# Patient Record
Sex: MALE | Employment: FULL TIME | URBAN - METROPOLITAN AREA
[De-identification: names, ages, dates, MRNs, and addresses within clinical notes are randomized per-mention and may not be internally consistent; named-entity substitution may affect disease eponyms.]

---

## 2023-03-02 ENCOUNTER — TELEPHONE (OUTPATIENT)
Dept: FAMILY MEDICINE | Facility: CLINIC | Age: 33
End: 2023-03-02

## 2023-03-02 NOTE — TELEPHONE ENCOUNTER
Left message to reschedule pt. Please help patient reschedule the canceled appt below:       - Provider:  Dr. Pang           - Canceled appt details         Date: 3/2/23         Time:  4:30pm         Type of visit:  Physical and Establish care      - Reason for change/cancellation: Provider call out       Notes to consider when rescheduling:  Can schedule in any same day or provider approval.        Please close encounter once the patient has been rescheduled    Harry Camarena MA on 3/2/2023 at 8:14 AM

## 2023-03-09 ENCOUNTER — OFFICE VISIT (OUTPATIENT)
Dept: FAMILY MEDICINE | Facility: CLINIC | Age: 33
End: 2023-03-09
Payer: COMMERCIAL

## 2023-03-09 VITALS
RESPIRATION RATE: 9 BRPM | HEIGHT: 69 IN | BODY MASS INDEX: 21.03 KG/M2 | OXYGEN SATURATION: 98 % | WEIGHT: 142 LBS | TEMPERATURE: 97.7 F | SYSTOLIC BLOOD PRESSURE: 123 MMHG | DIASTOLIC BLOOD PRESSURE: 69 MMHG | HEART RATE: 87 BPM

## 2023-03-09 DIAGNOSIS — Z13.220 LIPID SCREENING: ICD-10-CM

## 2023-03-09 DIAGNOSIS — Z28.21 COVID-19 VACCINATION DECLINED: ICD-10-CM

## 2023-03-09 DIAGNOSIS — Z28.21 TETANUS, DIPHTHERIA, AND ACELLULAR PERTUSSIS (TDAP) VACCINATION DECLINED: ICD-10-CM

## 2023-03-09 DIAGNOSIS — Z28.21 INFLUENZA VACCINATION DECLINED: ICD-10-CM

## 2023-03-09 DIAGNOSIS — Z00.00 ROUTINE GENERAL MEDICAL EXAMINATION AT A HEALTH CARE FACILITY: Primary | ICD-10-CM

## 2023-03-09 DIAGNOSIS — R10.13 ABDOMINAL PAIN, EPIGASTRIC: ICD-10-CM

## 2023-03-09 DIAGNOSIS — Z13.1 SCREENING FOR DIABETES MELLITUS: ICD-10-CM

## 2023-03-09 PROCEDURE — 99213 OFFICE O/P EST LOW 20 MIN: CPT | Mod: 25 | Performed by: FAMILY MEDICINE

## 2023-03-09 PROCEDURE — 80048 BASIC METABOLIC PNL TOTAL CA: CPT | Performed by: FAMILY MEDICINE

## 2023-03-09 PROCEDURE — 99385 PREV VISIT NEW AGE 18-39: CPT | Mod: 25 | Performed by: FAMILY MEDICINE

## 2023-03-09 PROCEDURE — 80061 LIPID PANEL: CPT | Performed by: FAMILY MEDICINE

## 2023-03-09 PROCEDURE — 36415 COLL VENOUS BLD VENIPUNCTURE: CPT | Performed by: FAMILY MEDICINE

## 2023-03-09 ASSESSMENT — ENCOUNTER SYMPTOMS
ABDOMINAL PAIN: 1
HEADACHES: 1

## 2023-03-09 ASSESSMENT — PAIN SCALES - GENERAL: PAINLEVEL: MILD PAIN (3)

## 2023-03-09 NOTE — H&P (VIEW-ONLY)
SUBJECTIVE:   CC: Jd is an 32 year old who presents for preventative health visit.     Patient has been advised of split billing requirements and indicates understanding: Yes  Healthy Habits:     Getting at least 3 servings of Calcium per day:  Yes    Bi-annual eye exam:  NO    Dental care twice a year:  Yes    Sleep apnea or symptoms of sleep apnea:  Daytime drowsiness    Diet:  Regular (no restrictions)    Frequency of exercise:  2-3 days/week    Duration of exercise:  Greater than 60 minutes    Taking medications regularly:  Yes    Medication side effects:  Not applicable    PHQ-2 Total Score: 0    Additional concerns today:  No    Has been dealing with Gastro problems for years, epigastric pain. Would like referral to Gastro.     Told it is anxiety. Feels the pain is causing anxiety.    Today's PHQ-2 Score:   PHQ-2 ( 1999 Pfizer) 3/9/2023   Q1: Little interest or pleasure in doing things 0   Q2: Feeling down, depressed or hopeless 0   PHQ-2 Score 0   Q1: Little interest or pleasure in doing things Not at all   Q2: Feeling down, depressed or hopeless Not at all   PHQ-2 Score 0     Have you ever done Advance Care Planning? (For example, a Health Directive, POLST, or a discussion with a medical provider or your loved ones about your wishes): No, advance care planning information given to patient to review.  Advanced care planning was discussed at today's visit.    Social History     Tobacco Use     Smoking status: Never     Smokeless tobacco: Current     Types: Chew   Substance Use Topics     Alcohol use: Yes     Alcohol/week: 1.0 standard drink     Types: 1 Standard drinks or equivalent per week         Alcohol Use 3/9/2023   Prescreen: >3 drinks/day or >7 drinks/week? No     Last PSA: No results found for: PSA    Reviewed orders with patient. Reviewed health maintenance and updated orders accordingly - Yes  Lab work is in process  BP Readings from Last 3 Encounters:   03/09/23 123/69    Wt Readings from Last 3  Encounters:   03/09/23 64.4 kg (142 lb)                  Patient Active Problem List   Diagnosis   (none) - all problems resolved or deleted     Past Surgical History:   Procedure Laterality Date     COLONOSCOPY       TONSILLECTOMY         Social History     Tobacco Use     Smoking status: Never     Smokeless tobacco: Current     Types: Chew   Substance Use Topics     Alcohol use: Yes     Alcohol/week: 1.0 standard drink     Types: 1 Standard drinks or equivalent per week     Family History   Problem Relation Age of Onset     Anxiety Disorder Mother      Diabetes Type 2  Father      GI problems Father      Esophageal Cancer Paternal Grandmother      GI problems Paternal Half-Sister      GI problems Maternal Half-Sister      Prostate Cancer Maternal Uncle      Colon Cancer No family hx of      Breast Cancer No family hx of          No current outpatient medications on file.     Allergies   Allergen Reactions     Amoxicillin Hives     Nystatin-Triamcinolone Hives     Bacitracin Rash       Reviewed and updated as needed this visit by clinical staff   Tobacco  Allergies  Meds  Problems  Med Hx  Surg Hx  Fam Hx          Reviewed and updated as needed this visit by Provider   Tobacco  Allergies  Meds  Problems  Med Hx  Surg Hx  Fam Hx       Social history reviewed.  Past Medical History:   Diagnosis Date     Anxiety       Past Surgical History:   Procedure Laterality Date     COLONOSCOPY       TONSILLECTOMY       Review of Systems   Gastrointestinal: Positive for abdominal pain.   Genitourinary: Negative for impotence and penile discharge.   Neurological: Positive for headaches.     CONSTITUTIONAL: NEGATIVE for fever, chills, change in weight  INTEGUMENTARY/SKIN: NEGATIVE for worrisome rashes, moles or lesions  EYES: NEGATIVE for vision changes or irritation  ENT: NEGATIVE for ear, mouth and throat problems  RESP: NEGATIVE for significant cough or SOB  CV: NEGATIVE for chest pain, palpitations or peripheral  "edema  MUSCULOSKELETAL: NEGATIVE for significant arthralgias or myalgia  PSYCHIATRIC: NEGATIVE for changes in mood or affect    OBJECTIVE:   /69 (BP Location: Left arm, Patient Position: Sitting, Cuff Size: Adult Regular)   Pulse 87   Temp 97.7  F (36.5  C) (Temporal)   Resp (!) 9   Ht 1.765 m (5' 9.49\")   Wt 64.4 kg (142 lb)   SpO2 98%   BMI 20.68 kg/m      Physical Exam  GENERAL: healthy, alert and no distress  EYES: Eyes grossly normal to inspection, PERRL and conjunctivae and sclerae normal  HENT: ear canals and TM's normal, nose and mouth without ulcers or lesions  NECK: no adenopathy, no asymmetry, masses, or scars and thyroid normal to palpation  RESP: lungs clear to auscultation - no rales, rhonchi or wheezes  CV: regular rate and rhythm, normal S1 S2, no S3 or S4, no murmur, click or rub, no peripheral edema and peripheral pulses strong  ABDOMEN: soft, nontender, no hepatosplenomegaly, no masses and bowel sounds normal  MS: no gross musculoskeletal defects noted, no edema  SKIN: no suspicious lesions or rashes  NEURO: Normal strength and tone, mentation intact and speech normal  PSYCH: mentation appears normal, affect normal/bright    Diagnostic Test Results: Labs pending    ASSESSMENT/PLAN:   1. Routine general medical examination at a health care facility: Discussed personal health and safety. Routine screenings as below. Appropriate anticipatory guidance, vaccinations, and health screening recommendations delivered according to the USPSTF and other appropriate society guidelines.  Patient understands and is agreeable with the plan ordered below.  - REVIEW OF HEALTH MAINTENANCE PROTOCOL ORDERS  - Basic metabolic panel  (Ca, Cl, CO2, Creat, Gluc, K, Na, BUN); Future  - Lipid panel reflex to direct LDL Non-fasting; Future    2. Abdominal pain, epigastric: Recommend EGD. Reviewed outside imaging. Avoid any triggers. Check stool H. Pylori.  - Adult GI  Referral - Procedure Only; " Future  - Helicobacter pylori Antigen Stool; Future    3. Lipid screening    4. Screening for diabetes mellitus    5. Influenza vaccination declined    6. COVID-19 vaccination declined    7. Tetanus, diphtheria, and acellular pertussis (Tdap) vaccination declined    COUNSELING:   Reviewed preventive health counseling, as reflected in patient instructions       Regular exercise       Healthy diet/nutrition       Vision screening       Hearing screening    He reports that he has never smoked. His smokeless tobacco use includes chew.    Bernard Slater MD  [facility]    Disclaimer: This note consists of symbols derived from keyboarding, dictation and/or voice recognition software. As a result, there may be errors in the script that have gone undetected. Please consider this when interpreting information found in this chart.

## 2023-03-09 NOTE — PROGRESS NOTES
SUBJECTIVE:   CC: Jd is an 32 year old who presents for preventative health visit.     Patient has been advised of split billing requirements and indicates understanding: Yes  Healthy Habits:     Getting at least 3 servings of Calcium per day:  Yes    Bi-annual eye exam:  NO    Dental care twice a year:  Yes    Sleep apnea or symptoms of sleep apnea:  Daytime drowsiness    Diet:  Regular (no restrictions)    Frequency of exercise:  2-3 days/week    Duration of exercise:  Greater than 60 minutes    Taking medications regularly:  Yes    Medication side effects:  Not applicable    PHQ-2 Total Score: 0    Additional concerns today:  No    Has been dealing with Gastro problems for years, epigastric pain. Would like referral to Gastro.     Told it is anxiety. Feels the pain is causing anxiety.    Today's PHQ-2 Score:   PHQ-2 ( 1999 Pfizer) 3/9/2023   Q1: Little interest or pleasure in doing things 0   Q2: Feeling down, depressed or hopeless 0   PHQ-2 Score 0   Q1: Little interest or pleasure in doing things Not at all   Q2: Feeling down, depressed or hopeless Not at all   PHQ-2 Score 0     Have you ever done Advance Care Planning? (For example, a Health Directive, POLST, or a discussion with a medical provider or your loved ones about your wishes): No, advance care planning information given to patient to review.  Advanced care planning was discussed at today's visit.    Social History     Tobacco Use     Smoking status: Never     Smokeless tobacco: Current     Types: Chew   Substance Use Topics     Alcohol use: Yes     Alcohol/week: 1.0 standard drink     Types: 1 Standard drinks or equivalent per week         Alcohol Use 3/9/2023   Prescreen: >3 drinks/day or >7 drinks/week? No     Last PSA: No results found for: PSA    Reviewed orders with patient. Reviewed health maintenance and updated orders accordingly - Yes  Lab work is in process  BP Readings from Last 3 Encounters:   03/09/23 123/69    Wt Readings from Last 3  Encounters:   03/09/23 64.4 kg (142 lb)                  Patient Active Problem List   Diagnosis   (none) - all problems resolved or deleted     Past Surgical History:   Procedure Laterality Date     COLONOSCOPY       TONSILLECTOMY         Social History     Tobacco Use     Smoking status: Never     Smokeless tobacco: Current     Types: Chew   Substance Use Topics     Alcohol use: Yes     Alcohol/week: 1.0 standard drink     Types: 1 Standard drinks or equivalent per week     Family History   Problem Relation Age of Onset     Anxiety Disorder Mother      Diabetes Type 2  Father      GI problems Father      Esophageal Cancer Paternal Grandmother      GI problems Paternal Half-Sister      GI problems Maternal Half-Sister      Prostate Cancer Maternal Uncle      Colon Cancer No family hx of      Breast Cancer No family hx of          No current outpatient medications on file.     Allergies   Allergen Reactions     Amoxicillin Hives     Nystatin-Triamcinolone Hives     Bacitracin Rash       Reviewed and updated as needed this visit by clinical staff   Tobacco  Allergies  Meds  Problems  Med Hx  Surg Hx  Fam Hx          Reviewed and updated as needed this visit by Provider   Tobacco  Allergies  Meds  Problems  Med Hx  Surg Hx  Fam Hx       Social history reviewed.  Past Medical History:   Diagnosis Date     Anxiety       Past Surgical History:   Procedure Laterality Date     COLONOSCOPY       TONSILLECTOMY       Review of Systems   Gastrointestinal: Positive for abdominal pain.   Genitourinary: Negative for impotence and penile discharge.   Neurological: Positive for headaches.     CONSTITUTIONAL: NEGATIVE for fever, chills, change in weight  INTEGUMENTARY/SKIN: NEGATIVE for worrisome rashes, moles or lesions  EYES: NEGATIVE for vision changes or irritation  ENT: NEGATIVE for ear, mouth and throat problems  RESP: NEGATIVE for significant cough or SOB  CV: NEGATIVE for chest pain, palpitations or peripheral  "edema  MUSCULOSKELETAL: NEGATIVE for significant arthralgias or myalgia  PSYCHIATRIC: NEGATIVE for changes in mood or affect    OBJECTIVE:   /69 (BP Location: Left arm, Patient Position: Sitting, Cuff Size: Adult Regular)   Pulse 87   Temp 97.7  F (36.5  C) (Temporal)   Resp (!) 9   Ht 1.765 m (5' 9.49\")   Wt 64.4 kg (142 lb)   SpO2 98%   BMI 20.68 kg/m      Physical Exam  GENERAL: healthy, alert and no distress  EYES: Eyes grossly normal to inspection, PERRL and conjunctivae and sclerae normal  HENT: ear canals and TM's normal, nose and mouth without ulcers or lesions  NECK: no adenopathy, no asymmetry, masses, or scars and thyroid normal to palpation  RESP: lungs clear to auscultation - no rales, rhonchi or wheezes  CV: regular rate and rhythm, normal S1 S2, no S3 or S4, no murmur, click or rub, no peripheral edema and peripheral pulses strong  ABDOMEN: soft, nontender, no hepatosplenomegaly, no masses and bowel sounds normal  MS: no gross musculoskeletal defects noted, no edema  SKIN: no suspicious lesions or rashes  NEURO: Normal strength and tone, mentation intact and speech normal  PSYCH: mentation appears normal, affect normal/bright    Diagnostic Test Results: Labs pending    ASSESSMENT/PLAN:   1. Routine general medical examination at a health care facility: Discussed personal health and safety. Routine screenings as below. Appropriate anticipatory guidance, vaccinations, and health screening recommendations delivered according to the USPSTF and other appropriate society guidelines.  Patient understands and is agreeable with the plan ordered below.  - REVIEW OF HEALTH MAINTENANCE PROTOCOL ORDERS  - Basic metabolic panel  (Ca, Cl, CO2, Creat, Gluc, K, Na, BUN); Future  - Lipid panel reflex to direct LDL Non-fasting; Future    2. Abdominal pain, epigastric: Recommend EGD. Reviewed outside imaging. Avoid any triggers. Check stool H. Pylori.  - Adult GI  Referral - Procedure Only; " Future  - Helicobacter pylori Antigen Stool; Future    3. Lipid screening    4. Screening for diabetes mellitus    5. Influenza vaccination declined    6. COVID-19 vaccination declined    7. Tetanus, diphtheria, and acellular pertussis (Tdap) vaccination declined    COUNSELING:   Reviewed preventive health counseling, as reflected in patient instructions       Regular exercise       Healthy diet/nutrition       Vision screening       Hearing screening    He reports that he has never smoked. His smokeless tobacco use includes chew.    Bernard Slater MD  [facility]    Disclaimer: This note consists of symbols derived from keyboarding, dictation and/or voice recognition software. As a result, there may be errors in the script that have gone undetected. Please consider this when interpreting information found in this chart.

## 2023-03-10 LAB
ANION GAP SERPL CALCULATED.3IONS-SCNC: 4 MMOL/L (ref 3–14)
BUN SERPL-MCNC: 18 MG/DL (ref 7–30)
CALCIUM SERPL-MCNC: 9.6 MG/DL (ref 8.5–10.1)
CHLORIDE BLD-SCNC: 105 MMOL/L (ref 94–109)
CHOLEST SERPL-MCNC: 179 MG/DL
CO2 SERPL-SCNC: 29 MMOL/L (ref 20–32)
CREAT SERPL-MCNC: 0.92 MG/DL (ref 0.66–1.25)
FASTING STATUS PATIENT QL REPORTED: ABNORMAL
GFR SERPL CREATININE-BSD FRML MDRD: >90 ML/MIN/1.73M2
GLUCOSE BLD-MCNC: 102 MG/DL (ref 70–99)
HDLC SERPL-MCNC: 63 MG/DL
LDLC SERPL CALC-MCNC: 105 MG/DL
NONHDLC SERPL-MCNC: 116 MG/DL
POTASSIUM BLD-SCNC: 3.8 MMOL/L (ref 3.4–5.3)
SODIUM SERPL-SCNC: 138 MMOL/L (ref 133–144)
TRIGL SERPL-MCNC: 54 MG/DL

## 2023-03-10 NOTE — RESULT ENCOUNTER NOTE
Please inform of results if patient has not viewed in [facility] within 3 business days.    BMP - Your blood sugar was 102. Your kidney function and electrolytes were normal.    Lipids - Your cholesterol lab results were essentially normal.      Please call the clinic with any questions you may have.     Have a great day,    Dr. Pang

## 2023-03-14 ENCOUNTER — TELEPHONE (OUTPATIENT)
Dept: GASTROENTEROLOGY | Facility: CLINIC | Age: 33
End: 2023-03-14
Payer: COMMERCIAL

## 2023-03-14 PROCEDURE — 87338 HPYLORI STOOL AG IA: CPT | Performed by: FAMILY MEDICINE

## 2023-03-14 NOTE — TELEPHONE ENCOUNTER
Screening Questions  BLUE  KIND OF PREP RED  LOCATION [review exclusion criteria] GREEN  SEDATION TYPE        Y Are you active on mychart?       JAE Ordering/Referring Provider?        BCBS What type of coverage do you have?      N Have you had a positive covid test in the last 14 days?     20.68 1. BMI  [BMI 40+ - review exclusion criteria]    Y  2. Are you able to give consent for your medical care? [IF NO,RN REVIEW]          N  3. Are you taking any prescription pain medications on a routine schedule   (ex narcotics: oxycodone, roxicodone, oxycontin,  and percocet)? [RN Review]          3a. EXTENDED PREP What kind of prescription?     N 4. Do you have any chemical dependencies such as alcohol, street drugs, or methadone?        **If yes 3- 5 , please schedule with MAC sedation.**          IF YES TO ANY 6 - 10 - HOSPITAL SETTING ONLY.     N 6.   Do you need assistance transferring?     N 7.   Have you had a heart or lung transplant?    N 8.   Are you currently on dialysis?   N 9.   Do you use daily home oxygen?   N 10. Do you take nitroglycerin?   10a.  If yes, how often?     11. [FEMALES]   Are you currently pregnant?    11a.  If yes, how many weeks? [ Greater than 12 weeks, OR NEEDED]    N 12. Do you have Pulmonary Hypertension? *NEED PAC APPT AT [facility] w/ MAC*     N 13. [review exclusion criteria]  Do you have any implantable devices in your body (pacemaker, defib, LVAD)?    N 14. In the past 6 months, have you had any heart related issues including cardiomyopathy or heart attack?     14a.  If yes, did it require cardiac stenting if so when?      N 15. Have you had a stroke or Transient ischemic attack (TIA - aka  mini stroke ) within 6 months?      N 16. Do you have mod to severe Obstructive Sleep Apnea?  [Hospital only] not diagnosed    N 17. Do you have SEVERE AND UNCONTROLLED asthma? *NEED PAC APPT AT [facility] w/MAC*     18. Are you currently taking any blood thinners?     18a. No. Continue to  "19.   18b. Yes/no Blood Thinner: No [CONTINUE TO #19]    N 19. Do you take the medication Phentermine?    19a. If yes, \"Hold for 7 days before procedure.  Please consult your prescribing provider if you have questions about holding this medication.\"     N  20. Do you have chronic kidney disease?      N  21. Do you have a diagnosis of diabetes?     NA  22. On a regular basis do you go 3-5 days between bowel movements?      23. Preferred LOCAL Pharmacy for Pre Prescription    [ LIST ONLY ONE PHARMACY]     [id number] - [place], [place] - [address]        - CLOSING REMINDERS -    Informed patient they will need an adult    Cannot take any type of public or medical transportation alone    Conscious Sedation- Needs  for 6 hours after the procedure       MAC/General-Needs  for 24 hours after procedure    Pre-Procedure Covid test to be completed [[facility] PCR Testing Required]    Confirmed Nurse will call to complete assessment       - SCHEDULING DETAILS -  NO Hospital Setting Required? If yes, what is the exclusion?:    BENNIE  Surgeon    3/28  Date of Procedure  Upper Endoscopy [EGD]  [facility] wife's recommendation Sedation Type     N PAC / Pre-op Required                 "

## 2023-03-15 ENCOUNTER — TELEPHONE (OUTPATIENT)
Dept: GASTROENTEROLOGY | Facility: CLINIC | Age: 33
End: 2023-03-15

## 2023-03-15 NOTE — TELEPHONE ENCOUNTER
Called the Pt to discuss below. No answer, Left message.     Patient scheduled for Upper endoscopy (EGD) on 3/28/23.     Discuss Covid policy.     Arrival time: 1245. Procedure time 1330    Facility location: [facility]; [address], [place] [address]    Sedation type: MAC    Anticoagulations? No    Electronic implanted devices? No    Diabetic? No    Indication for procedure: epigastric abdominal pain    Prep instructions sent via [employer] [facility] prep script sent to [id number] - [place], [place] - [address] [facility]    Pre visit planning completed.    Francisca Thornton RN  Endoscopy Procedure Pre Assessment RN

## 2023-03-16 LAB — H PYLORI AG STL QL IA: NEGATIVE

## 2023-03-16 NOTE — RESULT ENCOUNTER NOTE
Please inform of results if patient has not viewed in [facility] within 3 business days.    Your H. Pylori bacteria stool test was normal.    Please call the clinic with any questions you may have.     Have a great day,    Dr. Pang

## 2023-03-20 NOTE — TELEPHONE ENCOUNTER
Pre assessment questions completed for upcoming Upper endoscopy (EGD) procedure scheduled on 03.28.2023    COVID policy reviewed.     Reviewed procedural arrival time 1245 and facility location [facility]; [address], [place] [address]    Designated  policy reviewed. Instructed to have someone stay 24 hours post procedure.     Anticoagulation/blood thinners? No    Electronic implanted devices? No    Diabetic? No    Reviewed procedure prep instructions.     Patient verbalized understanding and had no questions or concerns at this time.    Sobeida Cantu RN  Endoscopy Procedure Pre Assessment RN

## 2023-03-28 ENCOUNTER — HOSPITAL ENCOUNTER (OUTPATIENT)
Facility: AMBULATORY SURGERY CENTER | Age: 33
Discharge: HOME | End: 2023-03-28
Attending: SURGERY
Payer: COMMERCIAL

## 2023-03-28 ENCOUNTER — ANESTHESIA EVENT (OUTPATIENT)
Dept: SURGERY | Facility: AMBULATORY SURGERY CENTER | Age: 33
End: 2023-03-28
Payer: COMMERCIAL

## 2023-03-28 ENCOUNTER — ANESTHESIA (OUTPATIENT)
Dept: SURGERY | Facility: AMBULATORY SURGERY CENTER | Age: 33
End: 2023-03-28
Payer: COMMERCIAL

## 2023-03-28 VITALS
SYSTOLIC BLOOD PRESSURE: 116 MMHG | TEMPERATURE: 97.7 F | RESPIRATION RATE: 16 BRPM | OXYGEN SATURATION: 100 % | DIASTOLIC BLOOD PRESSURE: 63 MMHG

## 2023-03-28 VITALS — HEART RATE: 69 BPM

## 2023-03-28 LAB — UPPER GI ENDOSCOPY: NORMAL

## 2023-03-28 PROCEDURE — 43235 EGD DIAGNOSTIC BRUSH WASH: CPT | Performed by: SURGERY

## 2023-03-28 PROCEDURE — 43235 EGD DIAGNOSTIC BRUSH WASH: CPT

## 2023-03-28 PROCEDURE — G8918 PT W/O PREOP ORDER IV AB PRO: HCPCS

## 2023-03-28 PROCEDURE — G8907 PT DOC NO EVENTS ON DISCHARG: HCPCS

## 2023-03-28 RX ORDER — SODIUM CHLORIDE, SODIUM LACTATE, POTASSIUM CHLORIDE, CALCIUM CHLORIDE 600; 310; 30; 20 MG/100ML; MG/100ML; MG/100ML; MG/100ML
INJECTION, SOLUTION INTRAVENOUS CONTINUOUS
Status: DISCONTINUED | OUTPATIENT
Start: 2023-03-28 | End: 2023-03-29 | Stop reason: HOSPADM

## 2023-03-28 RX ORDER — PROPOFOL 10 MG/ML
INJECTION, EMULSION INTRAVENOUS CONTINUOUS PRN
Status: DISCONTINUED | OUTPATIENT
Start: 2023-03-28 | End: 2023-03-28

## 2023-03-28 RX ORDER — LIDOCAINE 40 MG/G
CREAM TOPICAL
Status: DISCONTINUED | OUTPATIENT
Start: 2023-03-28 | End: 2023-03-29 | Stop reason: HOSPADM

## 2023-03-28 RX ORDER — NALOXONE HYDROCHLORIDE 0.4 MG/ML
0.2 INJECTION, SOLUTION INTRAMUSCULAR; INTRAVENOUS; SUBCUTANEOUS
Status: DISCONTINUED | OUTPATIENT
Start: 2023-03-28 | End: 2023-03-29 | Stop reason: HOSPADM

## 2023-03-28 RX ORDER — NALOXONE HYDROCHLORIDE 0.4 MG/ML
0.4 INJECTION, SOLUTION INTRAMUSCULAR; INTRAVENOUS; SUBCUTANEOUS
Status: DISCONTINUED | OUTPATIENT
Start: 2023-03-28 | End: 2023-03-29 | Stop reason: HOSPADM

## 2023-03-28 RX ORDER — PROPOFOL 10 MG/ML
INJECTION, EMULSION INTRAVENOUS PRN
Status: DISCONTINUED | OUTPATIENT
Start: 2023-03-28 | End: 2023-03-28

## 2023-03-28 RX ORDER — ONDANSETRON 2 MG/ML
4 INJECTION INTRAMUSCULAR; INTRAVENOUS EVERY 6 HOURS PRN
Status: DISCONTINUED | OUTPATIENT
Start: 2023-03-28 | End: 2023-03-29 | Stop reason: HOSPADM

## 2023-03-28 RX ORDER — ONDANSETRON 2 MG/ML
4 INJECTION INTRAMUSCULAR; INTRAVENOUS
Status: DISCONTINUED | OUTPATIENT
Start: 2023-03-28 | End: 2023-03-29 | Stop reason: HOSPADM

## 2023-03-28 RX ORDER — FLUMAZENIL 0.1 MG/ML
0.2 INJECTION, SOLUTION INTRAVENOUS
Status: DISCONTINUED | OUTPATIENT
Start: 2023-03-28 | End: 2023-03-29 | Stop reason: HOSPADM

## 2023-03-28 RX ORDER — ONDANSETRON 4 MG/1
4 TABLET, ORALLY DISINTEGRATING ORAL EVERY 6 HOURS PRN
Status: DISCONTINUED | OUTPATIENT
Start: 2023-03-28 | End: 2023-03-29 | Stop reason: HOSPADM

## 2023-03-28 RX ORDER — PROCHLORPERAZINE MALEATE 10 MG
10 TABLET ORAL EVERY 6 HOURS PRN
Status: DISCONTINUED | OUTPATIENT
Start: 2023-03-28 | End: 2023-03-29 | Stop reason: HOSPADM

## 2023-03-28 RX ORDER — LIDOCAINE HYDROCHLORIDE 20 MG/ML
INJECTION, SOLUTION INFILTRATION; PERINEURAL PRN
Status: DISCONTINUED | OUTPATIENT
Start: 2023-03-28 | End: 2023-03-28

## 2023-03-28 RX ADMIN — LIDOCAINE HYDROCHLORIDE 60 MG: 20 INJECTION, SOLUTION INFILTRATION; PERINEURAL at 13:36

## 2023-03-28 RX ADMIN — PROPOFOL 200 MCG/KG/MIN: 10 INJECTION, EMULSION INTRAVENOUS at 13:41

## 2023-03-28 RX ADMIN — SODIUM CHLORIDE, SODIUM LACTATE, POTASSIUM CHLORIDE, CALCIUM CHLORIDE: 600; 310; 30; 20 INJECTION, SOLUTION INTRAVENOUS at 13:05

## 2023-03-28 RX ADMIN — PROPOFOL 70 MG: 10 INJECTION, EMULSION INTRAVENOUS at 13:40

## 2023-03-28 RX ADMIN — PROPOFOL 50 MG: 10 INJECTION, EMULSION INTRAVENOUS at 13:41

## 2023-03-28 NOTE — ANESTHESIA PREPROCEDURE EVALUATION
Anesthesia Pre-Procedure Evaluation    Patient: Jd Nava   MRN: [id number] : 1990        Procedure : Procedure(s):  Combined Esophagoscopy, Gastroscopy, Duodenoscopy (Egd) With Co2 Insufflation          Past Medical History:   Diagnosis Date     Anxiety       Past Surgical History:   Procedure Laterality Date     COLONOSCOPY       TONSILLECTOMY        Allergies   Allergen Reactions     Amoxicillin Hives     Nystatin-Triamcinolone Hives     Bacitracin Rash      Social History     Tobacco Use     Smoking status: Never     Smokeless tobacco: Current     Types: Chew   Substance Use Topics     Alcohol use: Yes     Alcohol/week: 1.0 standard drink     Types: 1 Standard drinks or equivalent per week      Wt Readings from Last 1 Encounters:   23 64.4 kg (142 lb)        Anesthesia Evaluation   Pt has had prior anesthetic.     No history of anesthetic complications       ROS/MED HX  ENT/Pulmonary:  - neg pulmonary ROS     Neurologic:  - neg neurologic ROS     Cardiovascular:  - neg cardiovascular ROS     METS/Exercise Tolerance: >4 METS    Hematologic:  - neg hematologic  ROS     Musculoskeletal:  - neg musculoskeletal ROS     GI/Hepatic:  - neg GI/hepatic ROS     Renal/Genitourinary:  - neg Renal ROS     Endo:  - neg endo ROS     Psychiatric/Substance Use:     (+) psychiatric history anxiety     Infectious Disease:  - neg infectious disease ROS     Malignancy:  - neg malignancy ROS     Other:  - neg other ROS          Physical Exam    Airway  airway exam normal      Mallampati: I   TM distance: > 3 FB   Neck ROM: full   Mouth opening: > 3 cm    Respiratory Devices and Support         Dental           Cardiovascular   cardiovascular exam normal       Rhythm and rate: regular and normal     Pulmonary   pulmonary exam normal        breath sounds clear to auscultation           OUTSIDE LABS:  CBC: No results found for: WBC, HGB, HCT, PLT  BMP:   Lab Results   Component Value Date     2023     POTASSIUM 3.8 03/09/2023    CHLORIDE 105 03/09/2023    CO2 29 03/09/2023    BUN 18 03/09/2023    CR 0.92 03/09/2023     (H) 03/09/2023     COAGS: No results found for: PTT, INR, FIBR  POC: No results found for: BGM, HCG, HCGS  HEPATIC: No results found for: ALBUMIN, PROTTOTAL, ALT, AST, GGT, ALKPHOS, BILITOTAL, BILIDIRECT, JACLYN  OTHER:   Lab Results   Component Value Date    REAGAN 9.6 03/09/2023       Anesthesia Plan    ASA Status:  1   NPO Status:  NPO Appropriate    Anesthesia Type: MAC.     - Reason for MAC: Deep or markedly invasive procedure (G8)   Induction: Propofol.   Maintenance: N/A.        Consents    Anesthesia Plan(s) and associated risks, benefits, and realistic alternatives discussed. Questions answered and patient/representative(s) expressed understanding.    - Discussed:     - Discussed with:  Patient    Use of blood products discussed: No .     Postoperative Care            Comments:           H&P reviewed: Unable to attach H&P to encounter due to EHR limitations. H&P Update: appropriate H&P reviewed, patient examined. No interval changes since H&P (within 30 days).         Ricki Castle, DO

## 2023-03-28 NOTE — ANESTHESIA POSTPROCEDURE EVALUATION
Patient: Jd Nava    Procedure: Procedure(s):  Combined Esophagoscopy, Gastroscopy, Duodenoscopy (Egd) With Co2 Insufflation       Anesthesia Type:  MAC    Note:  Disposition: Outpatient   Postop Pain Control: Uneventful            Sign Out: Well controlled pain   PONV: No   Neuro/Psych: Uneventful            Sign Out: Acceptable/Baseline neuro status   Airway/Respiratory: Uneventful            Sign Out: Acceptable/Baseline resp. status   CV/Hemodynamics: Uneventful            Sign Out: Acceptable CV status; No obvious hypovolemia; No obvious fluid overload   Other NRE: NONE   DID A NON-ROUTINE EVENT OCCUR? No           Last vitals:  Vitals Value Taken Time   BP 97/52 03/28/23 1403   Temp 97.7  F (36.5  C) 03/28/23 1348   Pulse     Resp 16 03/28/23 1403   SpO2 99 % 03/28/23 1403       Electronically Signed By: Ricki Castle DO  March 28, 2023  2:04 PM

## 2023-03-28 NOTE — ANESTHESIA CARE TRANSFER NOTE
Patient: Jd Nava    Procedure: Procedure(s):  Combined Esophagoscopy, Gastroscopy, Duodenoscopy (Egd) With Co2 Insufflation       Diagnosis: Abdominal pain, epigastric [R10.13]  Diagnosis Additional Information: No value filed.    Anesthesia Type:   MAC     Note:      Level of Consciousness: awake  Oxygen Supplementation: room air    Independent Airway: airway patency satisfactory and stable  Dentition: dentition unchanged  Vital Signs Stable: post-procedure vital signs reviewed and stable  Report to RN Given: handoff report given  Patient transferred to: Phase II    Handoff Report: Identifed the Patient, Identified the Reponsible Provider, Reviewed the pertinent medical history, Discussed the surgical course, Reviewed Intra-OP anesthesia mangement and issues during anesthesia, Set expectations for post-procedure period and Allowed opportunity for questions and acknowledgement of understanding      Vitals:  Vitals Value Taken Time   /63    Temp 97    Pulse 68    Resp 12    SpO2 98        Electronically Signed By: NAI Corona CRNA  March 28, 2023  1:47 PM

## 2023-04-01 ENCOUNTER — HEALTH MAINTENANCE LETTER (OUTPATIENT)
Age: 33
End: 2023-04-01

## 2023-05-16 ENCOUNTER — E-VISIT (OUTPATIENT)
Dept: FAMILY MEDICINE | Facility: CLINIC | Age: 33
End: 2023-05-16
Payer: COMMERCIAL

## 2023-05-16 DIAGNOSIS — R10.13 ABDOMINAL PAIN, EPIGASTRIC: Primary | ICD-10-CM

## 2023-05-16 PROCEDURE — 99421 OL DIG E/M SVC 5-10 MIN: CPT | Performed by: FAMILY MEDICINE

## 2023-05-17 NOTE — PATIENT INSTRUCTIONS
Hello,    I have placed the GI referral for you. At the [place] location it may come down to what day you are free to be seen (Most providers are there only 1-2 days per week in clinic) as well as appointment availability.     If this is too far out, I would also check insurance coverage and see if [facility] ([facility]) is in network. They are an outside affiliated partner.    You should get a call to schedule this appointment within 2 bushiness days. If for some reason you do not get a call, you can call [phone] to schedule.    Thank you,    Dr. Pang

## 2023-05-17 NOTE — TELEPHONE ENCOUNTER
Provider E-Visit time total (minutes): 5 min    Start time: 8:11 AM 5/17/2023     Reviewed EGD 3/28/23. Referral to GI placed.    Wrote AVS    End Time: 8:16 AM  5/17/2023     Bernard Slater MD  [facility]    Disclaimer: This note consists of symbols derived from keyboarding, dictation and/or voice recognition software. As a result, there may be errors in the script that have gone undetected. Please consider this when interpreting information found in this chart.

## 2023-06-26 ENCOUNTER — MYC MEDICAL ADVICE (OUTPATIENT)
Dept: GASTROENTEROLOGY | Facility: CLINIC | Age: 33
End: 2023-06-26
Payer: COMMERCIAL

## 2023-06-30 NOTE — TELEPHONE ENCOUNTER
Called to remind patient of their upcoming appointment with our GI clinic, on Tuesday 7/11/2023 at 12:45PM with Dr. Cecil Jovel. This appointment is scheduled as an in-person appt. Please arrive 15 minutes early to check in for your appointment. , if your appointment is virtual (video or telephone) you need to be in [place] for the visit. To reschedule or cancel patient to call [phone].    Penny Esqueda MA

## 2023-06-30 NOTE — TELEPHONE ENCOUNTER
REFERRAL INFORMATION:    Referring Provider:  Dr. Bernard Slater MD    Referring Clinic:  [facility]    Reason for Visit/Diagnosis: Epigastric abdominal pain     FUTURE VISIT INFORMATION:    Appointment Date: 7/11/2023    Appointment Time: 1 PM     NOTES STATUS DETAILS   OFFICE NOTE from Referring Provider [facility]  3/9/23- OV with Dr. Bernard Slater MD    OFFICE NOTE from Other Specialist N/A    HOSPITAL DISCHARGE SUMMARY/  ED VISITS Care Everywhere [facility]:  4/25/19- ED visit for abdominal pain with Dr. Yordan Stafford MD    OPERATIVE REPORT N/A    MEDICATION LIST Internal         ENDOSCOPY  [facility]:   3/28/23- Upper GI Endoscopy   COLONOSCOPY N/A    ERCP N/A    EUS N/A    STOOL TESTING [facility]:  3/14/23- Stool test for H. pylori   PERTINENT LABS Internal    PATHOLOGY REPORTS (RELATED) N/A    IMAGING (CT, MRI, EGD, MRCP, Small Bowel Follow Through/SBT, MR/CT Enterography) Care Everywhere [facility]:  4/25/19- CT Abd/Pelvis     Records Requested    Facility  [facility]  Fax: [phone]    Outcome 6/30/23 2:00 PM- Sent a fax req for 4/25/19- CT Abd/Pelvis to be pushed to PACS - [facility]

## 2023-07-11 ENCOUNTER — OFFICE VISIT (OUTPATIENT)
Dept: GASTROENTEROLOGY | Facility: CLINIC | Age: 33
End: 2023-07-11
Attending: FAMILY MEDICINE
Payer: COMMERCIAL

## 2023-07-11 ENCOUNTER — PRE VISIT (OUTPATIENT)
Dept: GASTROENTEROLOGY | Facility: CLINIC | Age: 33
End: 2023-07-11

## 2023-07-11 VITALS
OXYGEN SATURATION: 99 % | BODY MASS INDEX: 20.54 KG/M2 | HEART RATE: 85 BPM | DIASTOLIC BLOOD PRESSURE: 71 MMHG | WEIGHT: 143.5 LBS | HEIGHT: 70 IN | SYSTOLIC BLOOD PRESSURE: 111 MMHG

## 2023-07-11 DIAGNOSIS — R10.13 ABDOMINAL PAIN, EPIGASTRIC: ICD-10-CM

## 2023-07-11 PROCEDURE — 99205 OFFICE O/P NEW HI 60 MIN: CPT | Mod: GC | Performed by: STUDENT IN AN ORGANIZED HEALTH CARE EDUCATION/TRAINING PROGRAM

## 2023-07-11 RX ORDER — OMEPRAZOLE 40 MG/1
40 CAPSULE, DELAYED RELEASE ORAL DAILY
Qty: 90 CAPSULE | Refills: 0 | Status: SHIPPED | OUTPATIENT
Start: 2023-07-11

## 2023-07-11 ASSESSMENT — PAIN SCALES - GENERAL: PAINLEVEL: NO PAIN (0)

## 2023-07-11 NOTE — PROGRESS NOTES
[facility]:     New Consult for abdominal pain, constipation      Date of visit: 7/11/2023 --    Referring provider: PCP    CC: 33 year old male referred by PCP for evaluation of abdominal pain, constipation.    ASSESSMENT AND PLAN:    Mr. Nava is a 33 years old M with PMH of anxiety who presents to GI clinic for evaluation of abdominal pain.    #. GERD    Prior EGD unremarkable. Reports uncontrolled reflux symptoms that are triggered sometimes at night when he eats the wrong foods. Reports eating late at night, close to bedtime. Refuses to take medications, only uses Tums as needed.    -- Advised about daily PPI use.   -- Advised about lifestyle modifications.  -- Will start Omeprazole 40 mg once daily.   -- Reassess in 3 months.     #. Abdominal pain  #. Constipation    Symptoms likely related to IBS-C, less likely other etiologies such as IBD. Reports chronic abdominal pain and associated altered bowel habits (mostly constipation) and pain resolves after he has a good bowel movement. Has had unremarkable prior abdominal imaging. Reports ~ 15-20 lbs weight loss he past few years.    -- Will check CBC, CMP, CRP, ESR.  -- Will check for nutritional markers.  -- Will arrange for Colonoscopy, rule out IBD.  -- Will check Fecal calprotectin.  -- Advised to start Fiber supplement.  -- Advised about daily Miralax to manage constipation.           Return to Clinic: 3 months    Cecil Jovel MD      Patient discussed and seen with Gastroenterology staff Dr. Gamble, who is in agreement with the above.     ====================================================================================================================================  HPI: Mr. Nava is a 33 years old M with PMH of anxiety who presents to GI clinic for evaluation of abdominal pain.    Patient is here today with his wife. Reports multiple symptoms. Says that for years now he has been experiencing abdominal pain,  "uncontrolled reflux, also palpitations/dizziness/shortness of breath sometimes associated with these episodes. He says that other providers tell him it's all just anxiety, but he refuses to take any medications for that.     For his reflux, he does not want to take a medication. Says he wants to make dietary changes that could fix all his issues, says he feels too young to be dependent on a daily medications.     He also repots constipation and stomach pain, that goes away when he has a bowel movement. Says that sometimes he will go without a BM for 3-4 days, but otherwise has a BM every morning.         Targeted GI review of systems complete and is otherwise negative.     Past Medical History:   Diagnosis Date     Anxiety        Past Surgical History:   Procedure Laterality Date     COLONOSCOPY       COMBINED ESOPHAGOSCOPY, GASTROSCOPY, DUODENOSCOPY (EGD) WITH CO2 INSUFFLATION N/A 3/28/2023    Procedure: Combined Esophagoscopy, Gastroscopy, Duodenoscopy (Egd) With Co2 Insufflation;  Surgeon: Kenneth Ribeiro, ;  Location: [facility] OR     TONSILLECTOMY         Family History   Problem Relation Age of Onset     Anxiety Disorder Mother      Diabetes Type 2  Father      GI problems Father      Esophageal Cancer Paternal Grandmother      GI problems Paternal Half-Sister      GI problems Maternal Half-Sister      Prostate Cancer Maternal Uncle      Colon Cancer No family hx of      Breast Cancer No family hx of        Social History     Tobacco Use     Smoking status: Never     Smokeless tobacco: Current     Types: Chew   Substance Use Topics     Alcohol use: Yes     Alcohol/week: 1.0 standard drink of alcohol     Types: 1 Standard drinks or equivalent per week       Physical exam:     Vitals:/71   Pulse 85   Ht 1.778 m (5' 10\")   Wt 65.1 kg (143 lb 8 oz)   SpO2 99%   BMI 20.59 kg/m     BMI: Body mass index is 20.59 kg/m .      GEN: NAD, A&Ox3, appropriate  HEENT: EOMI, PERRLA, MMM, hearing grossly " intact  Neck: full ROM  Cardiopulmonary: non labored, comfortable on RA, nondiaphoretic, no LE edema  Abdominal: soft, nontender, nondistended, no HSM, no rebound, no guarding, normoactive BS  Skin: no jaundice, no gross lesions on visible skin  Neuro: A&Ox3, grossly intact motor/sensation, gait intact  MSK: no gross deformity, moves arms/legs equally  Psych: normal affect, congruent with mood      Labs:       Relevant imaging:       Endoscopy:     EGD 03/2023    Impression:               - Normal esophagus.                             - Normal stomach.                             - Normal examined duodenum.                             - No specimens collected.       Pathology:       Relevant surgical reports:

## 2023-07-11 NOTE — PATIENT INSTRUCTIONS
Thank you for visiting us today!      Please start taking Omeprazole 40 mg every day, at night 30-60 minutes before dinner.    Please try to avoid eating a meal for at least 2 hours before bedtime.    We will get some bloodwork, stool testing done.    We will order a colonoscopy to make sure you don't have IBD or other pathologies.    Please take Miralax and fiber every day to treat your constipation. We recommend Citrucel for that purpose.     We will see you again in 3 months.

## 2023-07-11 NOTE — NURSING NOTE
"Chief Complaint   Patient presents with     New Patient       Vitals:    07/11/23 1243   BP: 111/71   Pulse: 85   SpO2: 99%   Weight: 65.1 kg (143 lb 8 oz)   Height: 1.778 m (5' 10\")       Body mass index is 20.59 kg/m .    [employer]    "

## 2023-07-11 NOTE — LETTER
7/11/2023         RE: Jd Nava  [address] [address]        Dear Colleague,    Thank you for referring your patient, Jd Nava, to the [facility] [place]. Please see a copy of my visit note below.    [facility]:     New Consult for abdominal pain, constipation      Date of visit: 7/11/2023 --    Referring provider: PCP    CC: 33 year old male referred by PCP for evaluation of abdominal pain, constipation.    ASSESSMENT AND PLAN:    Mr. Nava is a 33 years old M with PMH of anxiety who presents to GI clinic for evaluation of abdominal pain.    #. GERD    Prior EGD unremarkable. Reports uncontrolled reflux symptoms that are triggered sometimes at night when he eats the wrong foods. Reports eating late at night, close to bedtime. Refuses to take medications, only uses Tums as needed.    -- Advised about daily PPI use.   -- Advised about lifestyle modifications.  -- Will start Omeprazole 40 mg once daily.   -- Reassess in 3 months.     #. Abdominal pain  #. Constipation    Symptoms likely related to IBS-C, less likely other etiologies such as IBD. Reports chronic abdominal pain and associated altered bowel habits (mostly constipation) and pain resolves after he has a good bowel movement. Has had unremarkable prior abdominal imaging. Reports ~ 15-20 lbs weight loss he past few years.    -- Will check CBC, CMP, CRP, ESR.  -- Will check for nutritional markers.  -- Will arrange for Colonoscopy, rule out IBD.  -- Will check Fecal calprotectin.  -- Advised to start Fiber supplement.  -- Advised about daily Miralax to manage constipation.           Return to Clinic: 3 months    Cecil Jovel MD      Patient discussed and seen with Gastroenterology staff Dr. Gamble, who is in agreement with the above.      ====================================================================================================================================  HPI: Mr. Nava is a 33 years old M with PMH of anxiety who presents to GI clinic for evaluation of abdominal pain.    Patient is here today with his wife. Reports multiple symptoms. Says that for years now he has been experiencing abdominal pain, uncontrolled reflux, also palpitations/dizziness/shortness of breath sometimes associated with these episodes. He says that other providers tell him it's all just anxiety, but he refuses to take any medications for that.     For his reflux, he does not want to take a medication. Says he wants to make dietary changes that could fix all his issues, says he feels too young to be dependent on a daily medications.     He also repots constipation and stomach pain, that goes away when he has a bowel movement. Says that sometimes he will go without a BM for 3-4 days, but otherwise has a BM every morning.         Targeted GI review of systems complete and is otherwise negative.     Past Medical History:   Diagnosis Date    Anxiety        Past Surgical History:   Procedure Laterality Date    COLONOSCOPY      COMBINED ESOPHAGOSCOPY, GASTROSCOPY, DUODENOSCOPY (EGD) WITH CO2 INSUFFLATION N/A 3/28/2023    Procedure: Combined Esophagoscopy, Gastroscopy, Duodenoscopy (Egd) With Co2 Insufflation;  Surgeon: Kenneth Ribeiro, DO;  Location: [facility] OR    TONSILLECTOMY         Family History   Problem Relation Age of Onset    Anxiety Disorder Mother     Diabetes Type 2  Father     GI problems Father     Esophageal Cancer Paternal Grandmother     GI problems Paternal Half-Sister     GI problems Maternal Half-Sister     Prostate Cancer Maternal Uncle     Colon Cancer No family hx of     Breast Cancer No family hx of        Social History     Tobacco Use    Smoking status: Never    Smokeless tobacco: Current     Types: Chew   Substance Use Topics    Alcohol  "use: Yes     Alcohol/week: 1.0 standard drink of alcohol     Types: 1 Standard drinks or equivalent per week       Physical exam:     Vitals:/71   Pulse 85   Ht 1.778 m (5' 10\")   Wt 65.1 kg (143 lb 8 oz)   SpO2 99%   BMI 20.59 kg/m     BMI: Body mass index is 20.59 kg/m .      GEN: NAD, A&Ox3, appropriate  HEENT: EOMI, PERRLA, MMM, hearing grossly intact  Neck: full ROM  Cardiopulmonary: non labored, comfortable on RA, nondiaphoretic, no LE edema  Abdominal: soft, nontender, nondistended, no HSM, no rebound, no guarding, normoactive BS  Skin: no jaundice, no gross lesions on visible skin  Neuro: A&Ox3, grossly intact motor/sensation, gait intact  MSK: no gross deformity, moves arms/legs equally  Psych: normal affect, congruent with mood      Labs:       Relevant imaging:       Endoscopy:     EGD 03/2023    Impression:               - Normal esophagus.                             - Normal stomach.                             - Normal examined duodenum.                             - No specimens collected.       Pathology:       Relevant surgical reports:       Attestation signed by Keith Gamble MD at 7/19/2023  5:31 PM (Updated):  ATTENDING ATTESTATION:     DATE SEEN: 7/11/2023    Patient was discussed, seen, and examined by Keith corral. The plan of care and pertinent data/imaging were reviewed with Dr. Jovel. I agree with the assessment and plan as delineated above.    Please contact me with any further questions.    Thank you for this consultation.  It was a pleasure to participate in the care of this patient; please contact us with any further questions.  I spent a total of 60 minutes during the day of encounter performed chart review, meeting with patient, patient counseling, care coordination, and documentation.          Again, thank you for allowing me to participate in the care of your patient.      Sincerely,    Cecil Jovel MD    "

## 2023-07-13 ENCOUNTER — TELEPHONE (OUTPATIENT)
Dept: GASTROENTEROLOGY | Facility: CLINIC | Age: 33
End: 2023-07-13
Payer: COMMERCIAL

## 2023-07-13 NOTE — TELEPHONE ENCOUNTER
DIANE and sent mychart for patient to schedule:    '[facility] GI' in person visit with Dr. Jovel for 3 mo follow-up (around 10/11/23)

## 2024-02-01 ENCOUNTER — MEDICAL CORRESPONDENCE (OUTPATIENT)
Dept: HEALTH INFORMATION MANAGEMENT | Facility: CLINIC | Age: 34
End: 2024-02-01
Payer: COMMERCIAL

## 2024-02-08 ENCOUNTER — PATIENT OUTREACH (OUTPATIENT)
Dept: CARE COORDINATION | Facility: CLINIC | Age: 34
End: 2024-02-08
Payer: COMMERCIAL

## 2024-02-22 ENCOUNTER — PATIENT OUTREACH (OUTPATIENT)
Dept: CARE COORDINATION | Facility: CLINIC | Age: 34
End: 2024-02-22
Payer: COMMERCIAL

## 2024-02-26 ENCOUNTER — LAB (OUTPATIENT)
Dept: LAB | Facility: OTHER | Age: 34
End: 2024-02-26
Payer: COMMERCIAL

## 2024-02-26 DIAGNOSIS — Z30.2 ENCOUNTER FOR STERILIZATION: Primary | ICD-10-CM

## 2024-02-26 LAB
SEMEN ANALYSIS P VAS PNL: NORMAL
SPERM MOTILE SMN QL MICRO: NORMAL

## 2024-02-26 PROCEDURE — 89321 SEMEN ANAL SPERM DETECTION: CPT

## 2024-06-01 ENCOUNTER — HEALTH MAINTENANCE LETTER (OUTPATIENT)
Age: 34
End: 2024-06-01

## 2024-09-05 ENCOUNTER — PATIENT OUTREACH (OUTPATIENT)
Dept: CARE COORDINATION | Facility: CLINIC | Age: 34
End: 2024-09-05
Payer: COMMERCIAL

## 2025-06-14 ENCOUNTER — HEALTH MAINTENANCE LETTER (OUTPATIENT)
Age: 35
End: 2025-06-14